# Patient Record
(demographics unavailable — no encounter records)

---

## 2017-08-11 NOTE — PHYS DOC
Adult General


Chief Complaint


Chief Complaint:  MEDICATION REFILL





[facility]


HPI





Patient is a 23  year old female presents to the emergency department stating 

that she needs a refill of her Celexa 40 mg in which she takes daily. She also 

states that she needs a refill of her Percocet 10 mg. She states that she is in 

between doctors and does not have enough to get her through. She states that 

she has a doctor's appointment in 3 weeks. Patient states that she lives in 

[facility] and has recently moved to this area.





Review of Systems


Review of Systems





Constitutional: Denies fever or chills []


Eyes: Denies change in visual acuity, redness, or eye pain []


HENT: Denies nasal congestion or sore throat []


Respiratory: Denies cough or shortness of breath []


Cardiovascular: No additional information not addressed in HPI []


GI: Denies abdominal pain, nausea, vomiting, bloody stools or diarrhea []


: Denies dysuria or hematuria []


Musculoskeletal: Denies back pain or joint pain []


Integument: Denies rash or skin lesions []


Neurologic: Denies headache, focal weakness or sensory changes []


Endocrine: Denies polyuria or polydipsia []





Allergies


Allergies





Allergies








Coded Allergies Type Severity Reaction Last Updated Verified


 


  Penicillins Allergy Intermediate  8/11/17 Yes


 


  cyclobenzaprine Allergy Intermediate  8/11/17 Yes


 


  morphine Allergy Intermediate  8/11/17 Yes











Physical Exam


Physical Exam





Constitutional: Well developed, well nourished, no acute distress, non-toxic 

appearance. []


HENT: Normocephalic, atraumatic, bilateral external ears normal, oropharynx 

moist, no oral exudates, nose normal. []


Eyes: PERRLA, EOMI, conjunctiva normal, no discharge. [] 


Neck: Normal range of motion, no tenderness, supple, no stridor. [] 


Cardiovascular:Heart rate regular rhythm, no murmur []


Lungs & Thorax:  Bilateral breath sounds clear to auscultation []


Skin: Warm, dry, no erythema, no rash. [] 


Back: No tenderness


Extremities: No tenderness, no cyanosis, no clubbing, ROM intact, no edema. [] 


Neurologic: Alert and oriented X 3, normal motor function, normal sensory 

function, no focal deficits noted. []


Psychologic: Affect normal, judgement normal, mood normal. []





Current Patient Data


Vital Signs





 Vital Signs








  Date Time  Temp Pulse Resp B/P (MAP) Pulse Ox O2 Delivery O2 Flow Rate FiO2


 


8/11/17 14:07 98.3 108 18 123/77 (92) 100 Room Air  





 98.3       











EKG


EKG


[]





Radiology/Procedures


Radiology/Procedures


[]





Course & Med Decision Making


Course & Med Decision Making


Pertinent Labs and Imaging studies reviewed. (See chart for details)


We'll provide patient with Celexa here in the emergency department. She'll be 

provided with a prescription for this medication for at least the next 2 weeks. 

Patient will also be provided with a Percocet here in the emergency department. 

She was informed that we do not refill chronic pain medication here in the 

emergency department. Patient states she understood this however she does have 

a doctor's appointment 3 weeks. Patient also states that she is supposed to 

have surgery done in [facility]. Patient was instructed to use 

Tylenol or ibuprofen for pain and discomfort. Also once again mentioned to her 

will fall her prescription for Celexa. Patient agrees with discharge 

instructions, treatment regimens and follow-up recommendations. Signs and 

symptoms to return back to emergency department as been provided. All questions 

were answered for patient at her bedside.


After setting up the prescription for Celexa and talking with her about 

Percocet patient has asked if I can't find her with a higher dose of Percocet 

here in the emergency department which she can cut in half and take half of it 

now and half ago when she gets home. Explained to her that we have to document 

the actual encounter of her taking the medication to get in half and allowing 

her to take at home was not a possibility. Patient states that she had talked 

to her physician who is going to fax her Percocet and her Celexa prescription 

up to the pharmacy here. Patient is requesting to have a Percocet here and will 

be discharged home.


[]





Dragon Disclaimer


Dragon Disclaimer


This electronic medical record was generated, in whole or in part, using a 

voice recognition dictation system.





Departure


Departure


Impression:  


 Primary Impression:  


 Medication refill


Disposition:  01 HOME, SELF-CARE


Condition:  STABLE


Patient Instructions:  Medication Refill, Emergency Department





Additional Instructions:  


Activity as tolerated.


Tylenol or ibuprofen for pain and discomfort.


Follow-up with your physician in which you have an appointment in 3 weeks.


Return to emergency department sign symptoms of become worse.











KAYLYN CARRANZA Aug 11, 2017 14:09